# Patient Record
Sex: MALE | Employment: STUDENT | URBAN - METROPOLITAN AREA
[De-identification: names, ages, dates, MRNs, and addresses within clinical notes are randomized per-mention and may not be internally consistent; named-entity substitution may affect disease eponyms.]

---

## 2017-04-15 RX ORDER — DEXAMETHASONE 4 MG/1
TABLET ORAL
Qty: 12 G | Refills: 2 | Status: SHIPPED | OUTPATIENT
Start: 2017-04-15

## 2017-06-01 RX ORDER — ALBUTEROL SULFATE 90 MCG
HFA AEROSOL WITH ADAPTER (GRAM) INHALATION
Qty: 6.7 G | Refills: 6 | Status: SHIPPED | OUTPATIENT
Start: 2017-06-01 | End: 2017-07-01

## 2017-06-07 ENCOUNTER — HOSPITAL ENCOUNTER (OUTPATIENT)
Age: 22
Discharge: HOME | End: 2017-06-07
Attending: EMERGENCY MEDICINE
Payer: COMMERCIAL

## 2017-06-07 ENCOUNTER — APPOINTMENT (OUTPATIENT)
Dept: GENERAL RADIOLOGY | Age: 22
End: 2017-06-07
Attending: EMERGENCY MEDICINE
Payer: COMMERCIAL

## 2017-06-07 VITALS
TEMPERATURE: 98 F | SYSTOLIC BLOOD PRESSURE: 139 MMHG | RESPIRATION RATE: 16 BRPM | BODY MASS INDEX: 27.35 KG/M2 | OXYGEN SATURATION: 100 % | WEIGHT: 220 LBS | HEART RATE: 72 BPM | DIASTOLIC BLOOD PRESSURE: 86 MMHG | HEIGHT: 75 IN

## 2017-06-07 DIAGNOSIS — R06.2 WHEEZING: ICD-10-CM

## 2017-06-07 DIAGNOSIS — S63.621A SPRAIN OF INTERPHALANGEAL JOINT OF RIGHT THUMB, INITIAL ENCOUNTER: Primary | ICD-10-CM

## 2017-06-07 PROCEDURE — 99213 OFFICE O/P EST LOW 20 MIN: CPT

## 2017-06-07 PROCEDURE — 73140 X-RAY EXAM OF FINGER(S): CPT | Performed by: EMERGENCY MEDICINE

## 2017-06-07 PROCEDURE — 29130 APPL FINGER SPLINT STATIC: CPT

## 2017-06-07 NOTE — ED INITIAL ASSESSMENT (HPI)
Patient states he injured his right thumb while playing softball on Monday. The ball jammed his right thumb. Also states that he has a history of asthma and is having intermittent wheezing and difficulty breathing since December.  Denies wheezing and diffic

## 2017-06-07 NOTE — ED PROVIDER NOTES
Patient presents with:  Finger Injury  Dyspnea MARIANNA SOB (respiratory)    HPI:     Wilfredo Bledsoe is a 22year old male who presents with chief complaint of thumb injury and seeking advice on what to do about an ongoing wheezing issue.   Thumb injury occurred 2 hypertrophy, uvular edema or tonsillar exudate. Neck: supple, symmetrical, trachea midline. No appreciable masses.   Lungs: no respiratory distress, CTAB, no wheezes, rales or rhonchi  Heart: regular rate and rhythm, no m/r/g  Extremities: R hand - R thu patient. See AVS for detailed discharge instructions.

## 2024-03-25 ENCOUNTER — TELEPHONE (OUTPATIENT)
Dept: FAMILY MEDICINE CLINIC | Facility: CLINIC | Age: 29
End: 2024-03-25

## 2024-03-25 ENCOUNTER — MED REC SCAN ONLY (OUTPATIENT)
Dept: FAMILY MEDICINE CLINIC | Facility: CLINIC | Age: 29
End: 2024-03-25

## 2024-03-25 NOTE — TELEPHONE ENCOUNTER
Received a faxx from [facility] [employer] from pt requesting medical records.     Copy made and sent to scanning

## (undated) NOTE — ED AVS SNAPSHOT
[facility] [place] [address] [address]    Phone:  [phone]    Fax:  [address]   MRN: [id number]    Department:  [facility] in [place]   Date of Visit:  6/7/2017           Diagnose If you have any problems with your follow-up, please call our  at [phone]. Si usted tiene algun problema con saxena sequimiento, por favor llame a nuestro adminstrador de casos al [phone].     Expect to receive an electronic reques Shama Culver [address] ([address]) [place] ([address] ([address]) [address]  (Do PROCEDURE:  XR FINGER(S) (MIN 2 VIEWS), RIGHT THUMB (CPT=73140)     INDICATIONS:  right thumb injury and breathing problem     COMPARISON:  None. TECHNIQUE:  Three views of the finger were obtained.      PATIENT STATED HISTORY: (As transcribed by Collette Richmond